# Patient Record
Sex: MALE | Race: WHITE
[De-identification: names, ages, dates, MRNs, and addresses within clinical notes are randomized per-mention and may not be internally consistent; named-entity substitution may affect disease eponyms.]

---

## 2020-02-04 ENCOUNTER — HOSPITAL ENCOUNTER (INPATIENT)
Dept: HOSPITAL 25 - ED | Age: 70
LOS: 1 days | Discharge: ANOTHER HOSPITAL | DRG: 446 | End: 2020-02-05
Attending: INTERNAL MEDICINE | Admitting: HOSPITALIST
Payer: MEDICARE

## 2020-02-04 DIAGNOSIS — Z79.82: ICD-10-CM

## 2020-02-04 DIAGNOSIS — E78.00: ICD-10-CM

## 2020-02-04 DIAGNOSIS — I10: ICD-10-CM

## 2020-02-04 DIAGNOSIS — Z88.6: ICD-10-CM

## 2020-02-04 DIAGNOSIS — Z88.8: ICD-10-CM

## 2020-02-04 DIAGNOSIS — Z88.0: ICD-10-CM

## 2020-02-04 DIAGNOSIS — Z95.5: ICD-10-CM

## 2020-02-04 DIAGNOSIS — I25.10: ICD-10-CM

## 2020-02-04 DIAGNOSIS — Z79.4: ICD-10-CM

## 2020-02-04 DIAGNOSIS — Z87.891: ICD-10-CM

## 2020-02-04 DIAGNOSIS — I25.2: ICD-10-CM

## 2020-02-04 DIAGNOSIS — Z96.652: ICD-10-CM

## 2020-02-04 DIAGNOSIS — K80.42: Primary | ICD-10-CM

## 2020-02-04 DIAGNOSIS — E78.5: ICD-10-CM

## 2020-02-04 DIAGNOSIS — E11.9: ICD-10-CM

## 2020-02-04 DIAGNOSIS — Z79.899: ICD-10-CM

## 2020-02-04 DIAGNOSIS — M19.071: ICD-10-CM

## 2020-02-04 LAB
ALBUMIN SERPL BCG-MCNC: 3.7 G/DL (ref 3.2–5.2)
ALBUMIN/GLOB SERPL: 1.2 {RATIO} (ref 1–3)
ALP SERPL-CCNC: 456 U/L (ref 34–104)
ALT SERPL W P-5'-P-CCNC: 99 U/L (ref 7–52)
ANION GAP SERPL CALC-SCNC: 10 MMOL/L (ref 2–11)
AST SERPL-CCNC: 124 U/L (ref 13–39)
BASOPHILS # BLD AUTO: 0 10^3/UL (ref 0–0.2)
BUN SERPL-MCNC: 24 MG/DL (ref 6–24)
BUN/CREAT SERPL: 13 (ref 8–20)
CALCIUM SERPL-MCNC: 8.3 MG/DL (ref 8.6–10.3)
CHLORIDE SERPL-SCNC: 101 MMOL/L (ref 101–111)
EOSINOPHIL # BLD AUTO: 0 10^3/UL (ref 0–0.6)
GLOBULIN SER CALC-MCNC: 3 G/DL (ref 2–4)
GLUCOSE SERPL-MCNC: 136 MG/DL (ref 70–100)
HCO3 SERPL-SCNC: 26 MMOL/L (ref 22–32)
HCT VFR BLD AUTO: 40 % (ref 42–52)
HGB BLD-MCNC: 14.1 G/DL (ref 14–18)
INR PPP/BLD: 1.33 (ref 0.82–1.09)
LYMPHOCYTES # BLD AUTO: 0.4 10^3/UL (ref 1–4.8)
MCH RBC QN AUTO: 30 PG (ref 27–31)
MCHC RBC AUTO-ENTMCNC: 35 G/DL (ref 31–36)
MCV RBC AUTO: 85 FL (ref 80–94)
MONOCYTES # BLD AUTO: 0.6 10^3/UL (ref 0–0.8)
NEUTROPHILS # BLD AUTO: 17.9 10^3/UL (ref 1.5–7.7)
NRBC # BLD AUTO: 0 10^3/UL
NRBC BLD QL AUTO: 0
PLATELET # BLD AUTO: 163 10^3/UL (ref 150–450)
POTASSIUM SERPL-SCNC: 4.5 MMOL/L (ref 3.5–5)
PROT SERPL-MCNC: 6.7 G/DL (ref 6.4–8.9)
RBC # BLD AUTO: 4.7 10^6 /UL (ref 4.18–5.48)
SODIUM SERPL-SCNC: 137 MMOL/L (ref 135–145)
WBC # BLD AUTO: 19 10^3/UL (ref 3.5–10.8)

## 2020-02-04 PROCEDURE — 96361 HYDRATE IV INFUSION ADD-ON: CPT

## 2020-02-04 PROCEDURE — 80053 COMPREHEN METABOLIC PANEL: CPT

## 2020-02-04 PROCEDURE — 85027 COMPLETE CBC AUTOMATED: CPT

## 2020-02-04 PROCEDURE — 99283 EMERGENCY DEPT VISIT LOW MDM: CPT

## 2020-02-04 PROCEDURE — 80076 HEPATIC FUNCTION PANEL: CPT

## 2020-02-04 PROCEDURE — 82248 BILIRUBIN DIRECT: CPT

## 2020-02-04 PROCEDURE — 36415 COLL VENOUS BLD VENIPUNCTURE: CPT

## 2020-02-04 PROCEDURE — 74181 MRI ABDOMEN W/O CONTRAST: CPT

## 2020-02-04 PROCEDURE — 96365 THER/PROPH/DIAG IV INF INIT: CPT

## 2020-02-04 PROCEDURE — 83690 ASSAY OF LIPASE: CPT

## 2020-02-04 PROCEDURE — 87040 BLOOD CULTURE FOR BACTERIA: CPT

## 2020-02-04 PROCEDURE — 85610 PROTHROMBIN TIME: CPT

## 2020-02-04 PROCEDURE — 85025 COMPLETE CBC W/AUTO DIFF WBC: CPT

## 2020-02-04 PROCEDURE — 80048 BASIC METABOLIC PNL TOTAL CA: CPT

## 2020-02-04 PROCEDURE — 76705 ECHO EXAM OF ABDOMEN: CPT

## 2020-02-04 PROCEDURE — 74177 CT ABD & PELVIS W/CONTRAST: CPT

## 2020-02-04 PROCEDURE — 76376 3D RENDER W/INTRP POSTPROCES: CPT

## 2020-02-04 NOTE — ED
Complex/Multi-Sys Presentation





- HPI Summary


HPI Summary: 


Patient is a 70 y/o M presenting to [facility] for evaluation of abnormal bloodwork. 

He states that he is scheduled to have an upper endoscopy in two days. Patient 

had pre-procedure bloodwork; he was called this afternoon and was instructed to 

go to ED for evaluation of abnormal bloodwork. Patient denies pain. Recent 

weight loss of about 14 lbs since Thanksgiving 2019 is reported. He notes that 

he has not been trying to lose this weight. Jaundice of skin is noted as well. N

/V and fever reported. These Sx have been present for the past few months. No 

diarrhea and no changes of the color in his stool. Most recent CT of ABD/PEL 

per patient is Thanksgiving 2019. Patient is scheduled for MRI. Hx of diabetes, 

HTN, cardiac stent, gout noted. No Hx of abdominal surgeries. He is a non-

smoker and notes that he consumes alcohol. FMHx of cancer in father noted. Home 

medications and allergies are reviewed. Wife is present in the room. 





- History Of Current Complaint


Chief Complaint: EDAbdPain


Time Seen by Provider: 02/04/20 19:54


Hx Obtained From: Patient


Onset/Duration: Lasting Weeks, Still Present


Timing: Constant


Severity Currently: None


Associated Signs And Symptoms: Positive: Nausea, Vomiting, Fever, Other - 

positive - weight loss, jaundice; negative - diarrhea, changes in color of stool





- Allergies/Home Medications


Allergies/Adverse Reactions: 


 Allergies











Allergy/AdvReac Type Severity Reaction Status Date / Time


 


warfarin Allergy  Hives Verified 02/05/20 01:00


 


acetaminophen [From Vicodin] AdvReac  Nausea Verified 02/05/20 01:12


 


hydrocodone [From Vicodin] AdvReac  Nausea Verified 02/05/20 01:12


 


Penicillins AdvReac  Diarrhea Verified 02/05/20 01:12


 


Statins-Hmg-Coa Reductase AdvReac  Muscle Ache Verified 02/05/20 01:12





Inhibitor     














PMH/Surg Hx/FS Hx/Imm Hx


Endocrine/Hematology History: Reports: Hx Diabetes - DM II


   Denies: Hx Bone Marrow Disease, Hx Sickle Cell Disease, Hx Anemia


Cardiovascular History: Reports: Hx Angina, Hx Coronary Artery Disease, Hx 

Hypercholesterolemia, Hx Hypertension, Hx Myocardial Infarction


   Denies: Hx Cardiomegaly, Hx Congestive Heart Failure, Hx Pacemaker/ICD, Hx 

Peripheral Vascular Disease, Hx Rheumatic Fever, Hx Valvular Heart Disease, 

Other Cardiovascular Problems/Disorders


Respiratory History: 


   Denies: Hx Asthma, Hx Chronic Obstructive Pulmonary Disease (COPD), Hx 

Pulmonary Edema, Hx Pulmonary Embolism, Other Respiratory Problems/Disorders


GI History: 


   Denies: Other GI Disorders


Musculoskeletal History: Reports: Hx Arthritis - LEFT KNEE, RIGHT ANKLE


Sensory History: Reports: Hx Contacts or Glasses - READING GLASSES


   Denies: Hx Cataracts, Hx Glaucoma, Hx Hearing Aid


Opthamlomology History: Reports: Hx Contacts or Glasses - READING GLASSES


   Denies: Hx Cataracts, Hx Glaucoma





- Cancer History


Hx Chemotherapy: No





- Surgical History


Surgery Procedure, Year, and Place: 1981 R leg surgery.  stent placed in heart  

12/21/09- [facility].  11/13- LEFT ROTATOR CUFF REPAIR- [facility]


Hx Anesthesia Reactions: No


Infectious Disease History: No


Infectious Disease History: Reports: Hx Hepatitis - as "a kid'


   Denies: History Other Infectious Disease, Traveled Outside the [place] in Last 30 

Days





- Social History


Alcohol Use: Daily


Alcohol Amount: 2 DRINKS DAILY


Substance Use Type: Reports: None


Smoking Status (MU): Former Smoker


Amount Used/How Often: 1PPD X 50 YEARS


Have You Smoked in the Last Year: No





Review of Systems


Constitutional: Other - positive - weight loss, abnormal bloodwork 


Positive: Fever - reported


Gastrointestinal: Other - negative - changes to stool color 


Positive: Vomiting, Nausea.  Negative: Diarrhea


Skin: Other - positive - jaundice 


All Other Systems Reviewed And Are Negative: Yes





Physical Exam





- Summary


Physical Exam Summary: 


Constitutional: Well-developed, Well-nourished, Alert. (-) Distressed


Skin: Slight Jaundice; Warm, Dry


HENT: Normocephalic; Atraumatic


Eyes: Conjunctiva normal


Neck: Musculoskeletal ROM normal neck. (-) JVD, (-) Stridor, (-) Tracheal 

deviation


Cardio: Rhythm regular, rate normal, Heart sounds normal; Intact distal pulses; 

Radial pulses are 2+ and symmetric. (-) Murmur


Pulmonary/Chest wall: Effort normal. (-) Respiratory distress, (-) Wheezes, (-) 

Rales


Abd: Soft, RUQ tenderness, (-) Distension, (-) Guarding, (-) Rebound


Musculoskeletal: (-) Edema


Lymph: (-) Cervical adenopathy


Neuro: Alert, Oriented x3


Psych: Mood and affect Normal








Triage Information Reviewed: Yes


Vital Signs On Initial Exam: 


 Initial Vitals











Temp Pulse Resp BP Pulse Ox


 


 98.3 F   87   18   121/74   94 


 


 02/04/20 17:47  02/04/20 17:47  02/04/20 17:47  02/04/20 17:47  02/04/20 17:47











Vital Signs Reviewed: Yes





Procedures





- Sedation


Patient Received Moderate/Deep Sedation with Procedure: No





Diagnostics





- Vital Signs


 Vital Signs











  Temp Pulse Resp BP Pulse Ox


 


 02/04/20 17:47  98.3 F  87  18  121/74  94














- Laboratory


Result Diagrams: 


 02/05/20 06:34





 02/05/20 06:34


Lab Statement: Any lab studies that have been ordered have been reviewed, and 

results considered in the medical decision making process.





Complex Multi-Symp Course/Dx


Course Of Treatment: Patient is here with worsening liver function tests over 

the past couple months.  Patient also had a substantial amount of weight loss.  

Patient was sent in by his GI doctor due to abnormal labs.  Patient does have a 

slight jaundiced tenderness to his skin.  Patient repeat lab work which shows 

elevated tbili and LFTs.  Patient was signed out to Dr. Kee pending CT scan 

results and admission





- Diagnoses


Provider Diagnoses: 


 Cholecystitis








Discharge ED





- Sign-Out/Discharge


Documenting (check all that apply): Sign-Out Patient


Signing out patient TO: Kimani Kee





- Discharge Plan


Condition: Stable


Disposition: ADMITTED TO [facility]





- Billing Disposition and Condition


Condition: STABLE


Disposition: Admitted to [facility]





- Attestation Statements


Document Initiated by Addison: Yes


Documenting Scribe: SHAYLEE YADAV


Provider For Whom Addison is Documenting (Include Credential): YUKI BARROSO MD


Scribe Attestation: 


SHAYLEE JORDAN, scribed for YUKI BARROSO MD on 02/05/20 at 2140. 


Scribe Documentation Reviewed: Yes


Provider Attestation: 


The documentation as recorded by the SHAYLEE downs accurately reflects 

the service I personally performed and the decisions made by me, YUKI BARROOS MD


Status of Scribe Document: Viewed

## 2020-02-04 NOTE — XMS REPORT
Continuity of Care Document (CCD)

 Created on:January 3, 2020



Patient:Charli Lemus

Sex:Male

:1950

External Reference #:[id number]





Demographics







 Address  [address] [place] [address]

 

 Home Phone  [phone]

 

 Mobile Phone  [phone]

 

 Email Address  [email or web address]

 

 Preferred Language  en

 

 Marital Status  Not  or 

 

 Rastafarian Affiliation  Unknown

 

 Race  White

 

 Ethnic Group  Not  or 









Author







 Name  Luis Carlos Colón M.D. (transmitted by agent of provider Vijaya Ram)

 

 Address  [address] [place] [id number]









Care Team Providers







 Name  Role  Phone

 

 Kiko Velázquez MD - Internal  Care Team Information   [phone]
[phone]



 Medicine    

 

 Luis Carlos Colón MD - Orthopaedic  Care Team Information   [phone]
[phone]



 Surgery    









Problems







 Active Problems  Provider  Date

 

 Lesion of ulnar nerve  Karen Muñoz M.D.  Onset: 2014

 

 Carpal tunnel syndrome  Karen Muñoz M.D.  Onset: 2014

 

 Localized, primary osteoarthritis  Luis Carlos Colón M.D.  Onset: 10/04/2016

 

 Essential hypertension  Stephanie Oliver M.D.  Onset: 2016

 

 Multi vessel coronary artery disease  Stephanie Oliver M.D.  Onset: 2016

 

 Hyperlipidemia  Stephanie Oliver M.D.  Onset: 2016

 

 Arthroplasty of knee  Luis Carlos Colón M.D.  Onset: 2016

 

 Chronic ischemic heart disease  Stephanie Oliver M.D.  Onset: 2017

 

 Mixed hyperlipidemia  Stephanie Oliver M.D.  Onset: 2017

 

 Premature beats  Stephanie Oliver M.D.  Onset: 2017







Social History







 Type  Date  Description  Comments

 

 Birth Sex    Unknown  

 

 Tobacco Use  Start: Unknown End:  Former Cigarette Smoker  



   Unknown  1 Pack Daily  

 

 Smoking Status  Reviewed: 20  Former Cigarette Smoker  



     1 Pack Daily  

 

 ETOH Use    Currently consumes  couple fingers of



     alcohol  whisky daily

 

 Tobacco Use  Start: Unknown End:  Patient is a former  quit 



   Unknown  smoker  

 

 Recreational Drug Use    Denies Drug Use  

 

 Exercise Type/Frequency    Exercises regularly  outdoor/yard work







Allergies, Adverse Reactions, Alerts







 Active Allergies  Reaction  Severity  Comments  Date

 

 Penicillins  G.I. upset      07/10/2014

 

 Vicodin  Nausea and Vomiting      07/10/2014

 

 Statins  muscle aching      07/10/2014

 

 Warfarin      Hives  2017







Medications







 Active Medications  SIG  Qnty  Indications  Ordering Provider  Date

 

 Carvedilol  1 by mouth tid      Unknown  



         25mg Tablets          



           

 

 Lisinopril  1 by mouth      Unknown  



         40mg Tablets  every day        



           

 

 Allopurinol  1/2 tab by      Unknown  



          300mg Tablets  mouth every day        



           

 

 Crestor  1 by mouth      Unknown  



      5mg Tablets  every day        



           

 

 Coq-10  1 by mouth      Unknown  



     100mg Capsules  every day        



           

 

 Janumet XR  1 by mouth once      Unknown  



         50-1000mg  daily        



 Tablets ER 24HR          



           

 

 Aspirin Ec  1 by mouth once      Unknown  



         325mg Tablets  a day        



 DR          

 

 Norvasc  1 by mouth  90tabs    Stephanie Oliver M.D.  



      5mg Tablets  every day        



           







Medications Administered in Office







 Medication  SIG  Qnty  Indications  Ordering Provider  Date

 

 Inj, Regadenoson, 0.1 MG        Sterling Quintanilla M.D.  2014



                  Injection          



           

 

 Inj, Regadenoson, 0.1 MG        Qutaybeh DAMARI Yung,  2014



                  Injection        MGardeniaDGardenia  



           

 

 Technetium TC 99M        Sterling Quintanilla M.D.  2014



 Tetrofosmin, Per Unit Dose          



 Up To 40 Millicuries          



              Injection          



           

 

 Technetium TC 99M        Lindseytayblalo SGardenia Yung,  2014



 Tetrofosmin, Per Unit Dose        M.DGardenia  



 Up To 40 Millicuries          



              Injection          



           







Immunizations







 Description

 

 No Information Available







Vital Signs







 Date  Vital  Result  Comment

 

 2020  9:29am  Height  68 inches  5'8"









 Weight  185.00 lb  

 

 Heart Rate  60 /min  

 

 BP Systolic Sitting  110 mmHg  

 

 BP Diastolic Sitting  66 mmHg  

 

 Body Temperature  97.5 F  

 

 BMI (Body Mass Index)  28.1 kg/m2  









 2019 12:53pm  Height  68 inches  5'8"









 Weight  184.00 lb  

 

 Heart Rate  91 /min  

 

 BP Systolic Sitting  110 mmHg  

 

 BP Diastolic Sitting  78 mmHg  

 

 Body Temperature  95.7 F  

 

 BMI (Body Mass Index)  28.0 kg/m2  







Results







 Description

 

 No Information Available







Procedures







 Description

 

 No Information Available







Medical Devices







 Description

 

 No Information Available







Encounters







 Type  Date  Location  Provider  Dx  Diagnosis

 

 Office Visit  2019  [facility]  [facility] WETZEL  R14.0  Abdominal



   1:00p  Of [facility] AT Go Mccoy MD    distension



           (gaseous)







Assessments







 Date  Code  Description  Provider

 

 2020  M17.12  Unilateral primary osteoarthritis, left knee  Luis Carlos Colón M.D.

 

 2020  Z96.652  Presence of left artificial knee joint  Luis Carlos Colón M.D.

 

 2019  R14.0  Abdominal distension (gaseous)  Olman Mccoy MD







Plan of Treatment

2020 - Luis Carlos Colón M.D.M17.12 Unilateral primary osteoarthritis, left 
kneeZ96.652 Presence of left artificial knee jointFollow up:One year with x-rays



Functional Status







 Description

 

 No Information Available







Mental Status







 Description

 

 No Information Available







Referrals







 Description

 

 No Information Available

## 2020-02-04 NOTE — ED
Progress





- Progress Note


Progress Note: 


Patient signed out from Dr. Siddiqi upon shift change 2/4/2020 2200 pending ABD/

PEL CT and disposition.





CT ABD/PEL SHOWS PER RADIOLOGIST: 1. No CT findings to correlate with patient's 

symptomatology. Specifically no pancreatic masses. 2. Bilateral inguinal 

hernias. No strangulation. ED physician has reviewed this report.





GALLBLADDER US SHOWS PER RADIOLOGIST: Gallbladder sludge and wall thickening 

which is an indeterminate finding of acute cholecystitis. No additional 

findings to correlate with patient's symptomatology. ED PHYSICIAN HAS REVIEWED 

THIS REPORT.





Re-Evaluation





- Re-Evaluation


  ** First Eval


Re-Evaluation Time: 22:27


Comment: patient updated on plan of care





  ** Second Eval


Re-Evaluation Time: 23:05


Comment: patient started on Zosyn





Course/Dx





- Course


Course Of Treatment: 68 y/o M signed out from Dr. Siddiqi upon shift change 2/4/ 2020 2200. CT ABD/PEL SHOWS PER RADIOLOGIST: 1. No CT findings to correlate 

with patient's symptomatology. Specifically no pancreatic masses. 2. Bilateral 

inguinal hernias. No strangulation.  Spoke with Dr. Umesh HANDY who recommends 

ordering gallbladder US and admission to hospitalist on antibiotics.  Patient 

started on Zosyn.  GALLBLADDER US SHOWS PER RADIOLOGIST: Gallbladder sludge and 

wall thickening which is an indeterminate finding of acute cholecystitis. No 

additional findings to correlate with patient's symptomatology.  Spoke with Dr. Jarvis hospitalist who agrees to admit patient.





- Diagnoses


Provider Diagnoses: 


 Cholecystitis








- Provider Notifications


Discussed Care Of Patient With: Yash Calvo


Time Discussed With Above Provider: 22:25


Instructed by Provider To: Other - Dr. Umesh HANDY is concerned about 

cholangitis. He recommends ordering gallbladder US and admission to hospitalist 

on antibiotics. 0022 Dr. Jarvis hospitalist agrees to admit patient.





Discharge ED





- Sign-Out/Discharge


Documenting (check all that apply): Patient Departure, Receiving Sign-Out


Receiving patient FROM: Boo Siddiqi





- Discharge Plan


Condition: Stable


Disposition: ADMITTED TO [facility]





- Billing Disposition and Condition


Condition: STABLE


Disposition: Admitted to [facility]





- Attestation Statements


Document Initiated by Scribe: Yes


Documenting Scribe: Kandace Ramos


Provider For Whom Scribe is Documenting (Include Credential): Kimani Kee MD


Scribe Attestation: 


I, Kandace Ramos, scribed for Kimani Kee MD on 02/05/20 at 0539. 


Scribe Documentation Reviewed: Yes


Provider Attestation: 


The documentation as recorded by the scribe, Kandace Ramos accurately reflects 

the service I personally performed and the decisions made by me, Kimani Kee MD


Status of Scribsheela Document: Viewed

## 2020-02-05 VITALS — SYSTOLIC BLOOD PRESSURE: 133 MMHG | DIASTOLIC BLOOD PRESSURE: 53 MMHG

## 2020-02-05 LAB
ALBUMIN SERPL BCG-MCNC: 3.3 G/DL (ref 3.2–5.2)
ALBUMIN/GLOB SERPL: 1.2 {RATIO} (ref 1–3)
ALP SERPL-CCNC: 341 U/L (ref 34–104)
ALT SERPL W P-5'-P-CCNC: 73 U/L (ref 7–52)
ANION GAP SERPL CALC-SCNC: 8 MMOL/L (ref 2–11)
AST SERPL-CCNC: 68 U/L (ref 13–39)
BUN SERPL-MCNC: 24 MG/DL (ref 6–24)
BUN/CREAT SERPL: 16.4 (ref 8–20)
CALCIUM SERPL-MCNC: 7.7 MG/DL (ref 8.6–10.3)
CHLORIDE SERPL-SCNC: 103 MMOL/L (ref 101–111)
GLOBULIN SER CALC-MCNC: 2.7 G/DL (ref 2–4)
GLUCOSE SERPL-MCNC: 87 MG/DL (ref 70–100)
HCO3 SERPL-SCNC: 27 MMOL/L (ref 22–32)
HCT VFR BLD AUTO: 38 % (ref 42–52)
HGB BLD-MCNC: 12.9 G/DL (ref 14–18)
MCH RBC QN AUTO: 30 PG (ref 27–31)
MCHC RBC AUTO-ENTMCNC: 34 G/DL (ref 31–36)
MCV RBC AUTO: 86 FL (ref 80–94)
PLATELET # BLD AUTO: 148 10^3/UL (ref 150–450)
POTASSIUM SERPL-SCNC: 4.1 MMOL/L (ref 3.5–5)
PROT SERPL-MCNC: 6 G/DL (ref 6.4–8.9)
RBC # BLD AUTO: 4.36 10^6 /UL (ref 4.18–5.48)
SODIUM SERPL-SCNC: 138 MMOL/L (ref 135–145)
WBC # BLD AUTO: 13 10^3/UL (ref 3.5–10.8)

## 2020-02-05 RX ADMIN — PIPERACILLIN AND TAZOBACTAM SCH: 3; .375 INJECTION, POWDER, LYOPHILIZED, FOR SOLUTION INTRAVENOUS; PARENTERAL at 12:14

## 2020-02-05 RX ADMIN — INSULIN LISPRO SCH: 100 INJECTION, SOLUTION INTRAVENOUS; SUBCUTANEOUS at 05:59

## 2020-02-05 RX ADMIN — PIPERACILLIN AND TAZOBACTAM SCH MLS/HR: 3; .375 INJECTION, POWDER, LYOPHILIZED, FOR SOLUTION INTRAVENOUS; PARENTERAL at 05:51

## 2020-02-05 RX ADMIN — SODIUM CHLORIDE SCH MLS/HR: 900 IRRIGANT IRRIGATION at 17:22

## 2020-02-05 RX ADMIN — SODIUM CHLORIDE SCH MLS/HR: 900 IRRIGANT IRRIGATION at 02:23

## 2020-02-05 RX ADMIN — INSULIN LISPRO SCH: 100 INJECTION, SOLUTION INTRAVENOUS; SUBCUTANEOUS at 11:22

## 2020-02-05 NOTE — CONSULT
Consult


Consult: 


CC: ABD pain x 3 months





HPI: 70 yo M presents with central ABD pain he states is postpriandial, 

worsening for 3 months, worse after fatty foods, and associated with nausea and 

occasional emesis. Confirms subjective fevers intermittently, including most 

recently yesterday. States 1 month ago had episode of melena and vomiting black 

emesis. Since then, no recurrence and bowels have been moving normally with no 

constipation, diarrhea, or blood in stool. Additionally, denies decreased 

appetite, weight loss, chest pain, SOB, dysuria. 





Surgical history: L knee replacement, L shoulder, cardiac stent. Denies issue 

ever with anesthesia, clotting, or bleeding. 


Social history: Former smoker of 1ppd 30 yrs, quit 18 years ago. 1 alcoholic 

drink per day. Retired . 


Family history: Father  from liver CA. Mother , lymphoma. 


PMH: CAD, MI, HTN, HLD, DM II, gout


Allergies: PCN, warfarin


 Home Medications











 Medication  Instructions  Recorded  Confirmed  Type


 


Allopurinol TAB* [Zyloprim 300  mg PO QAM 13 History





TAB*]    


 


Carvedilol TAB* [Coreg TAB*] 25 mg PO TID 13 History


 


Sitaglipt/Metform (NF) 1 tab PO DAILY 11/10/16 11/30/17 History





[Janumet  (NF)]    


 


oxyCODONE/Acetamin 5/325 MG* 2 tab PO Q4H PRN #90 tab MDD 10 16 

Rx





[Percocet 5/325 TAB*]    


 


Aspirin TAB* [Aspirin 325 MG TAB*] 325 mg PO DAILY 17 History


 


amLODIPine TAB* [Norvasc 5 mg TAB*] 5 mg PO DAILY 17 History


 


Lisinopril TAB* [Prinivil TAB*] 40 mg PO DAILY 20 History


 


Pantoprazole TAB * [Protonix TAB*] 40 mg PO DAILY 20 History


 


Rosuvastatin Calcium 5 mg PO 20  History


 


Sitagliptin Phos/Metformin HCl 1 tab PO 20 History





[Janumet  mg]    


 


Ubidecarenone [Coenzyme Q-10] 100 mg PO 20  History








ROS: 12 point review of systems negative, except as otherwise stated in HPI





 Laboratory Last Values











WBC  13.0 10^3/uL (3.5-10.8)  H  20  06:34    


 


RBC  4.36 10^6 /uL (4.18-5.48)   20  06:34    


 


Hgb  12.9 g/dL (14.0-18.0)  L  20  06:34    


 


Hct  38 % (42-52)  L  20  06:34    


 


MCV  86 fL (80-94)   20  06:34    


 


MCH  30 pg (27-31)   20  06:34    


 


MCHC  34 g/dL (31-36)   20  06:34    


 


RDW  14 % (10-15)   20  06:34    


 


Plt Count  148 10^3/uL (150-450)  L  20  06:34    


 


MPV  9.1 fL (7.4-10.4)   20  06:34    


 


Neut % (Auto)  94.5 %  20  20:33    


 


Lymph % (Auto)  2.1 %  20  20:33    


 


Mono % (Auto)  3.2 %  20  20:33    


 


Eos % (Auto)  0.1 %  20  20:33    


 


Baso % (Auto)  0.1 %  20  20:33    


 


Absolute Neuts (auto)  17.9 10^3/ul (1.5-7.7)  H  20  20:33    


 


Absolute Lymphs (auto)  0.4 10^3/ul (1.0-4.8)  L  20  20:33    


 


Absolute Monos (auto)  0.6 10^3/ul (0-0.8)   20  20:33    


 


Absolute Eos (auto)  0.0 10^3/ul (0-0.6)   20  20:33    


 


Absolute Basos (auto)  0.0 10^3/ul (0-0.2)   20  20:33    


 


Absolute Nucleated RBC  0.0 10^3/ul  20  20:33    


 


Nucleated RBC %  0.0   20  20:33    


 


INR (Anticoag Therapy)  1.33  (0.82-1.09)  H  20  20:33    


 


Sodium  138 mmol/L (135-145)   20  06:34    


 


Potassium  4.1 mmol/L (3.5-5.0)   20  06:34    


 


Chloride  103 mmol/L (101-111)   20  06:34    


 


Carbon Dioxide  27 mmol/L (22-32)   20  06:34    


 


Anion Gap  8 mmol/L (2-11)   20  06:34    


 


BUN  24 mg/dL (6-24)   20  06:34    


 


Creatinine  1.46 mg/dL (0.67-1.17)  H  20  06:34    


 


Est GFR ( Amer)  57.6  (>60)   20  06:34    


 


Est GFR (Non-Af Amer)  47.6  (>60)   20  06:34    


 


BUN/Creatinine Ratio  16.4  (8-20)   20  06:34    


 


Glucose  87 mg/dL ()   20  06:34    


 


POC Glucose (mg/dL)  110 mg/dL ()  H  20  05:57    


 


Calcium  7.7 mg/dL (8.6-10.3)  L  20  06:34    


 


Total Bilirubin  4.50 mg/dL (0.2-1.0)  H  20  06:34    


 


Direct Bilirubin  1.10 mg/dL (0.03-0.18)  H  20  06:34    


 


Indirect Bilirubin  3.4 mg/dL (0.3-1.0)  H  20  06:34    


 


AST  68 U/L (13-39)  H  20  06:34    


 


ALT  73 U/L (7-52)  H  20  06:34    


 


Alkaline Phosphatase  341 U/L ()  H  20  06:34    


 


Total Protein  6.0 g/dL (6.4-8.9)  L  20  06:34    


 


Albumin  3.3 g/dL (3.2-5.2)   20  06:34    


 


Globulin  2.7 g/dL (2-4)   20  06:34    


 


Albumin/Globulin Ratio  1.2  (1-3)   20  06:34    


 


Lipase  16 U/L (11.0-82.0)   20  20:33    








CT ABD/Pel W impression: No CT findings to correlate with  pt symptomatology. 

No pancreatic mass. Bilateral inguinal hernias. No strangulation. 





RUQ USN impression: Gallbladder sludge and wall thickening which is an 

indeterminate finding of acute cholecystitis. No additional findings to 

correlate with pt's symptomatology. 





PEX:


General: Alert, in NAD or discomfort


Integumentary: No rashes or lesions. 


HEENT: Scleral icterus. Oropharynx clear.


Heart: RRR


Lungs: CTAB


ABD: BS present. Soft, nondistended. Mild tenderness in RLQ. Negative Nielsen's. 

No palpable masses or organomegaly. 


Extremities: Calves nontender and soft. Distal pulses intact bilaterally. No 

edema. 








Assessment and plan: 70 yo M with ABD pain, jaundice, elevated white count. It 

is possible cholecystitis or cholangitis is a cause of his symptoms. MRCP is 

scheduled for today; we will re-assess after that.

## 2020-02-05 NOTE — TRS
CC:  Dr. Kiko Velázquez; Dr. Yash Calvo

 

DATE OF ADMISSION:  02/05/2020.

DATE OF TRANSFER:  02/05/2020.

 

PRIMARY CARE PHYSICIAN:  Dr. Kiko Velázquez. 

OUTPATIENT GASTROENTEROLOGIST:  Dr. Yash Calvo.

 

PRIMARY DIAGNOSIS:  Acute cholangitis versus choledocholithiasis.

 

SECONDARY DIAGNOSES:

1.  Coronary artery disease, status post stenting.

2.  Hypertension.

3.  Type 2 diabetes.

4.  Gout.

 

CONSULTS:

1.  Dr. Rod of Gastroenterology.

2.  Dr. Olman Mccoy of Surgery.

 

TRANSFER MEDICATIONS:

1.  Zosyn 3.375 gm q.8 hours IV.

2.  Morphine 2 mg IV every 6 hours as needed for mild to severe pain.

3.  Insulin Lispro sliding scale every 6 hours per fingerstick glucose.

4.  Zofran 4 mg IV every 6 hours as needed for nausea.

5.  Normal saline 100 ml/hour continuous infusion.

 

HOME MEDICATIONS, HELD ON ADMISSION:

1.  Carvedilol 25 mg twice a day.

2.  Lisinopril 40 mg daily.

3.  Rosuvastatin 5 mg daily.

4.  Allopurinol 300 mg daily.

5.  Amlodipine 5 mg daily.

6.  Janumet 50/1,000 mg daily.

 

HISTORY OF PRESENT ILLNESS:  

Mr. Lemus is a 69-year-old man with a history of type 2 diabetes, 
hypertension, and coronary artery disease who is presenting as referral from 
his GI doctor for fevers and elevated bilirubin.  The patient reports that 
around Thanksgiving, he began to have sharp, unrelenting abdominal pain that is 
centrally located and not radiating.  He had presented to the [facility] 
Emergency Room and underwent a CT of the abdomen, but states that he was sent 
home from there.  His pain continued and on the Sunday after Thanksgiving, he 
again went to the emergency room where he had an ultrasound performed.  It was 
equivocal for gallbladder disease and he was recommended to follow-up with a 
surgeon.  The patient reports he saw Dr. Mccoy on December 5th and it was 
felt that the patient did not have gallbladder disease.  The patient reports 
that he continued to experience vomiting three to four times per day throughout 
the beginning of December followed by several large bowel movements, and then 
his vomiting resolved. Throughout the rest of December, he would only 
intermittently have nausea and vomiting with sharp pain at the center of his 
abdomen only after he eats.  The patient states that if he does not eat, he 
does not have any pain.  He recently has been experiencing fevers up to 101 
degrees Fahrenheit at home and he also has noticed that his skin turned yellow.
  He was set to have an upper endoscopy in a few days, but his GI doctor found 
him to have elevated bilirubin and asked him to go to the emergency room for 
further evaluation.  The patient reports weight loss of 14 pounds since 
Thanksgiving.

 

HOSPITAL COURSE:  

In the emergency room, the patient was started on IV Zosyn per GI 
recommendations, as he was noted to have a leukocytosis to 19 on presentation 
with BHAVIN to 1.84 from baseline of normal around 1.0.  His total bilirubin was 
4.8 with that mostly indirect at 3.4.  His AST/ALT were 124/99 and alk phos was 
456.  He was initiated on IV fluids and admitted to the Hospitalist Service 
pending MRCP. Gallbladder ultrasound in the emergency room was significant for 
gallbladder sludge and wall thickening, which is an indeterminate finding of 
acute cholecystitis; his CBD measured 0.5 cm; he had a negative sonographic 
Nielsen sign.  The next morning, the patient underwent an MRCP which showed 
gallbladder distended without visualized stones, minimal biliary sludge noted; 
negative for gallbladder wall thickening or pericholecystic fluid; negative for 
intra- or extrahepatic biliary dilatation; there is a suggestion of low signal 
filling defects within the distal common bile duct measuring up to 0.5 cm 
concerning for ductal stones.  Upon review of these images with the GI consult 
team, he was recommended to transfer to a facility that has ERCP.  [facility]
, the closest institution, was called and they accepted the patient for 
admission.

 

PHYSICAL EXAMINATION ON DISCHARGE:  

General:  He is a well-appearing, pleasant man in no acute distress.  He is 
alert and interactive, appears very comfortable. 

Vital Signs:  T-max over the last 24 hours 98.5 with heart rate of 70, blood 
pressure 116/54, respiratory rate 18, oxygen saturation 97 percent on room air. 

HEENT:  Moist mucus membranes.  OP clear.  Upper dentures intact.  Sclerae 
icteric. 

Neck:  No JVD.  

Lungs:  Clear to auscultation bilaterally.  

Heart:  Regular rate and rhythm.  No murmurs, gallops, or rubs.  

Abdomen:  Soft, nontender, nondistended.  No hepatomegaly.  Negative Nielsen 
sign.  

Extremities:  Warm and well- perfused without evidence of edema.  

Neuro:  Cranial nerves II through XII intact without other focal deficits.  

Skin:  Jaundiced.

 

PERTINENT DIAGNOSTIC STUDIES:

1.  CBC notable for leukocytosis to 19 which improved to 13 overnight.

2.  Hemoglobin decreased 14.1 to 12.9 after IV fluids.  Platelets normal.

3.  BMP notable for creatinine increased to 1.84 and by next morning after 
fluids 1.46.

4.  LFT's on the morning of transfer:  AST/ALT 68/73.

5.  Alk phos 341.

6.  Total bili 4.5 with 1.1 direct and 3.4 indirect.

7.  INR 1.3.

8.  Gallbladder ultrasound with gallbladder sludge and wall thickening 
measuring 0.4 cm.  No pericholecystic fluid or sonographic Nielsen sign.  CBD 
measures 0.5 cm. Pancreas visualized is normal.

9.  Abdomen and pelvis CT with no CT findings to correlate with the patient's 
symptomatology, specifically no pancreatic masses; bilateral inguinal hernias 
noted, no strangulation; gallbladder normal without calcified stones or ductal 
dilatation.

10. MRCP with unremarkable liver; the gallbladder is distended without 
visualized stones; minimal biliary sludge is noted; negative for gallbladder 
wall thickening or pericholecystic fluid; negative for intra- or extrahepatic 
biliary dilatation, reference axial T2 images with suggestion of low signal 
filling defects within the distal common bile duct measuring up to 0.5 cm 
concerning for ductal stones; unremarkable pancreas; normal spleen.

 

TRANSFER PLAN:  

The patient is recommended to have an ERCP, and he has been accepted at [facility] in [place] for this procedure, which we do not offer at [facility] at this time.  He is hemodynamically stable and asymptomatic while he is 
kept NPO for pending procedure.  He will be continued on IV antibiotics with IV 
fluids and can continue to have fingerstick monitoring as he has a history of 
diabetes, although he has not required insulin sliding scale throughout this 
very short admission.  He has not been continued on his home medications.  
Notable, oral diabetic agents, cholesterol lowering medication, or blood 
pressure medicines, although these can be started as needed when the patient is 
able to tolerate p.o.

 

DIET:  NPO with IV maintenance fluids.

 

ACTIVITY:  As tolerated.

 

DISPOSITION:  To [facility].

 

CONDITION ON DISCHARGE:  Fair.

 

TIME SPENT:  Approximately 60 minutes were spent on the transfer of this patient
, more than half of which was spent with care coordination at bedside for 
interview and exam.

 

 [id number]/CPS #: [id number]

PHIL

## 2020-02-05 NOTE — HP
CC:  Dr. Kiko Velázquez; Dr. Calvo *

 

HISTORY AND PHYSICAL:

 

DATE OF ADMISSION:  20

 

PRIMARY CARE PROVIDER:  Dr. Kiko Velázquez.

 

CHIEF COMPLAINT:  Abdominal pain.

 

HISTORY OF PRESENT ILLNESS: Mr. Lemus is a 69-year-old male with a history 
of coronary artery disease, type 2 diabetes, hypertension, hyperlipidemia, and 
gout who has, since , had centrally located abdominal discomfort 
associated with eating and now found to have elevated bilirubin and LFTs and 
sent to the emergency room for evaluation.  The patient generally is a vague 
historian but states that on Thanksgiving day he began to have sharp 
unrelenting abdominal pain that is centrally located.  He presented to [facility], where he underwent CT scan of the abdomen.  It sounds as if he was sent 
home.  Pain continued, therefore the  following , he again 
went to the emergency room where this time he had an ultrasound performed.  It 
was questionable for gallbladder disease and therefore it was recommended that 
the patient follow up with a surgeon.  He was setup to see Dr. Mccoy.  That 
occurred on 19.  The patient states, however, from 19 through , he was vomiting 3 to 4 times per day.  He noticed that this was black.  At 
the appointment with Dr. Mccoy, it was felt that the patient did not have 
gallbladder disease.  The patient on 19 states that he "emptied himself 
out."  It sounds as if he had several large bowel movements.  Since that time, 
he had been having very small bowel movements though he did not feel 
constipated.  On 20, the patient states that he finally had a completely 
normal bowel movement and has continued to have bowel movement since. The 
patient states that since all of this began, he has had occasional nausea and 
now occasional vomiting.  He continues to have what he describes as sharp pain 
in the center of his abdomen after he eats.  He states if he does not eat, he 
has no pain.  He states that he has had fevers up to 101 at home.  He tells me 
this has been going on since early September.  He has not noticed that his skin 
is yellow. He has no other complaints at this time.

 

PAST MEDICAL HISTORY:

1.  Coronary artery disease, status post stenting.

2.  Hypertension.

3.  Hyperlipidemia.

4.  Type 2 diabetes.

5.  Gout.

 

PAST SURGICAL HISTORY:

1.  Left rotator cuff repair.

2.  Left knee replacement.

 

MEDICATIONS:  Unknown.

 

ALLERGIES:  PENICILLINS, VICODIN, WARFARIN, and STATIN.

 

FAMILY HISTORY:  Mom had diabetes, heart failure, and coronary artery disease.  
She  in her late 70s.  Dad  at the age of 53 of cancer; the patient 
does not know what type.

 

SOCIAL HISTORY:  The patient is a former smoker of 1 pack per day for at least 
30 years.  He quit in .  He drinks 1 alcoholic beverage per day.  However, 
he states he has not had any alcohol since .  He previously worked 
in construction.  He is .  He has 3 children.  He states that his 
daughter Anastasiya Mosquera would be his healthcare proxy.

 

REVIEW OF SYSTEMS:  Aside of what was discussed in the HPI, the patient also 
admits to 14- to 15-pound weight loss since .  The rest of the 11-
system review of systems is negative.

 

                               PHYSICAL EXAMINATION

 

GENERAL:  The patient is a well-developed, elderly male, seen lying on the 
stretcher, appearing to be in no acute distress.

 

VITAL SIGNS:  Blood pressure 119/65, pulse 74, respirations 18, temp 98.9, O2 
sat 94% on room air.

 

HEENT:  Pupils are equal.  Extraocular muscles are intact.  The patient has 
mildly icteric sclerae.  Oropharynx is clear.  The patient wears upper 
dentures.  Oral mucosa is moist.  There is no submandibular, cervical, or 
supraclavicular adenopathy.

 

PULMONARY:  Lungs are clear to auscultation bilaterally.

 

CARDIAC:  Normal S1, S2.  Regular rate and rhythm.  I do not appreciate any 
murmurs.  There is no lower extremity edema.

 

ABDOMEN:  Bowel sounds present.  Abdomen is soft, nontender, nondistended.

 

MUSCULOSKELETAL:  There is no cyanosis or clubbing of the digits.  There is 
full active range of motion of all 4 extremities.

 

NEURO:  Cranial nerves II through XII are grossly intact.  Sensation is intact 
to light touch throughout.  Strength is 5/5 and symmetric in both upper and 
lower extremities bilaterally.

 

PSYCH:  The patient is alert.  He is oriented x3.  He laughs frequently when I 
ask him questions.

 

SKIN:  Skin is yellowed.  There are no rashes.

 

 DIAGNOSTIC STUDIES/LAB DATA:  WBC 19, hemoglobin 14.1, hematocrit 40, 
platelets 163.  INR 1.33.  Sodium 137, potassium 4.5, chloride 101, CO2 26, BUN 
24, creatinine 1.84, glucose 136, calcium 8.3, bilirubin 4.8.  , ALT 99, 
alk phos 456.  Albumin 3.7, lipase 16.

 

Abdomen and pelvis CT:  No CT findings correlate with the patient's 
symptomatology, specifically no pancreatic masses.  There are bilateral 
inguinal hernias without strangulation.  Gallbladder ultrasound reveals 
gallbladder sludge and wall thickening, which is indeterminant finding of acute 
cholecystitis.  There are no additional findings to correlate with patient's 
symptomatology.

 

ASSESSMENT AND PLAN:  Mr. Lemus is a 69-year-old male who has had 2 months 
worth of centrally located sharp abdominal pain associated with eating, 14-
pound weight loss and now development of jaundice, who is sent to the emergency 
room for evaluation of liver lab abnormalities.

 

1.  Possible cholangitis:  The patient has findings on his labs including 
leukocytosis, elevated white blood cell count, and elevated LFTs that could be 
consistent with cholangitis.  Cholecystitis is also a possibility.  So for 
imaging has been unrevealing.  I will order an MRCP to further evaluate the 
biliary tree. He will likely need EGD with ERCP.  He will be started on Zosyn.  
He has received first dose in the emergency room without any ill side effects 
despite his reported allergy to PENICILLINS.  While the patient appears 
actually quite well, lying on the stretcher, I will obtain blood cultures.  GI 
consultation will be requested tomorrow.

2.  Type 2 diabetes:  The patient does not know what his medications are at 
home. For now, I am going to check fingersticks q.6 hours.  We will cover with 
lispro sliding scale.  Med list needs to be obtained from the patient's PCP 
when the office opens.

3.  Hypertension:  Blood pressure currently is under excellent control.  As 
above, the patient is unaware of his home medications.  Medication list will 
need to be obtained and then reconciled.

4.  Hyperlipidemia:  Same issue as above.

5.  Coronary artery disease:  The patient is currently chest pain free.  I am 
going to hold any potential medications that he takes at home including aspirin 
for now. It is concerning that he described having essentially black vomit for 
several days at the beginning of December.  He does not report that the vomit 
is black any longer.

6.  DVT prophylaxis:  According to the Adult Thrombosis Prophylaxis Risk Factor 
Assessment Guide, the patient has a total risk factor score of 3, making him 
high risk.  He will be having ambulation alone now for his DVT prophylaxis and 
insist patient of possibly needing EGD.

7.  Code status is full.

 

TIME SPENT:  Sixty five minutes was spent admitting this patient.

 

 

 

[id number]/CPS #: [id number]

PHIL